# Patient Record
Sex: MALE | Race: BLACK OR AFRICAN AMERICAN
[De-identification: names, ages, dates, MRNs, and addresses within clinical notes are randomized per-mention and may not be internally consistent; named-entity substitution may affect disease eponyms.]

---

## 2019-06-19 ENCOUNTER — HOSPITAL ENCOUNTER (OUTPATIENT)
Dept: HOSPITAL 62 - SP | Age: 54
End: 2019-06-19
Attending: PODIATRIST
Payer: MEDICARE

## 2019-06-19 DIAGNOSIS — L97.522: Primary | ICD-10-CM

## 2019-06-19 PROCEDURE — 93925 LOWER EXTREMITY STUDY: CPT

## 2019-06-19 PROCEDURE — 93922 UPR/L XTREMITY ART 2 LEVELS: CPT

## 2019-06-20 ENCOUNTER — HOSPITAL ENCOUNTER (OUTPATIENT)
Dept: HOSPITAL 62 - OD | Age: 54
End: 2019-06-20
Attending: PODIATRIST
Payer: MEDICARE

## 2019-06-20 DIAGNOSIS — E11.621: Primary | ICD-10-CM

## 2019-06-20 DIAGNOSIS — L97.522: ICD-10-CM

## 2019-06-20 DIAGNOSIS — M77.32: ICD-10-CM

## 2019-06-20 LAB
ADD MANUAL DIFF: NO
ALBUMIN SERPL-MCNC: 4.4 G/DL (ref 3.5–5)
ALP SERPL-CCNC: 134 U/L (ref 38–126)
ALT SERPL-CCNC: 36 U/L (ref 21–72)
ANION GAP SERPL CALC-SCNC: 12 MMOL/L (ref 5–19)
AST SERPL-CCNC: 28 U/L (ref 17–59)
BASOPHILS # BLD AUTO: 0.1 10^3/UL (ref 0–0.2)
BASOPHILS NFR BLD AUTO: 0.9 % (ref 0–2)
BILIRUB DIRECT SERPL-MCNC: 0.2 MG/DL (ref 0–0.4)
BILIRUB SERPL-MCNC: 0.5 MG/DL (ref 0.2–1.3)
BUN SERPL-MCNC: 14 MG/DL (ref 7–20)
CALCIUM: 9.5 MG/DL (ref 8.4–10.2)
CHLORIDE SERPL-SCNC: 103 MMOL/L (ref 98–107)
CO2 SERPL-SCNC: 27 MMOL/L (ref 22–30)
CRP SERPL-MCNC: 6.4 MG/L (ref ?–10)
EOSINOPHIL # BLD AUTO: 0.4 10^3/UL (ref 0–0.6)
EOSINOPHIL NFR BLD AUTO: 4.6 % (ref 0–6)
ERYTHROCYTE [DISTWIDTH] IN BLOOD BY AUTOMATED COUNT: 15.5 % (ref 11.5–14)
ERYTHROCYTE [SEDIMENTATION RATE] IN BLOOD: 62 MM/HR (ref 0–20)
GLUCOSE SERPL-MCNC: 52 MG/DL (ref 75–110)
HCT VFR BLD CALC: 43.6 % (ref 37.9–51)
HGB BLD-MCNC: 14.5 G/DL (ref 13.5–17)
LYMPHOCYTES # BLD AUTO: 1.8 10^3/UL (ref 0.5–4.7)
LYMPHOCYTES NFR BLD AUTO: 22.1 % (ref 13–45)
MCH RBC QN AUTO: 28.8 PG (ref 27–33.4)
MCHC RBC AUTO-ENTMCNC: 33.3 G/DL (ref 32–36)
MCV RBC AUTO: 86 FL (ref 80–97)
MONOCYTES # BLD AUTO: 0.7 10^3/UL (ref 0.1–1.4)
MONOCYTES NFR BLD AUTO: 8.4 % (ref 3–13)
NEUTROPHILS # BLD AUTO: 5.2 10^3/UL (ref 1.7–8.2)
NEUTS SEG NFR BLD AUTO: 64 % (ref 42–78)
PLATELET # BLD: 263 10^3/UL (ref 150–450)
POTASSIUM SERPL-SCNC: 3.9 MMOL/L (ref 3.6–5)
PROT SERPL-MCNC: 8.3 G/DL (ref 6.3–8.2)
RBC # BLD AUTO: 5.04 10^6/UL (ref 4.35–5.55)
SODIUM SERPL-SCNC: 141.5 MMOL/L (ref 137–145)
TOTAL CELLS COUNTED % (AUTO): 100 %
WBC # BLD AUTO: 8.1 10^3/UL (ref 4–10.5)

## 2019-06-20 PROCEDURE — 83036 HEMOGLOBIN GLYCOSYLATED A1C: CPT

## 2019-06-20 PROCEDURE — 80053 COMPREHEN METABOLIC PANEL: CPT

## 2019-06-20 PROCEDURE — 85025 COMPLETE CBC W/AUTO DIFF WBC: CPT

## 2019-06-20 PROCEDURE — 36415 COLL VENOUS BLD VENIPUNCTURE: CPT

## 2019-06-20 PROCEDURE — 85652 RBC SED RATE AUTOMATED: CPT

## 2019-06-20 PROCEDURE — 86140 C-REACTIVE PROTEIN: CPT

## 2019-06-20 NOTE — RADIOLOGY REPORT (SQ)
EXAM DESCRIPTION:  FOOT LEFT COMPLETE



COMPLETED DATE/TIME:  6/20/2019 12:26 pm



REASON FOR STUDY:  NON-PRS CHRONIC ULCER OTH PRT LEFT FOOT W FAT LAYER EXPOSED L97.522  NON-PRS CHRON
IC ULCER OTH PRT LEFT FOOT W FAT LAYER  E11.621  TYPE 2 DIABETES MELLITUS WITH FOOT ULCER



COMPARISON:  None.



NUMBER OF VIEWS:  Three views.



TECHNIQUE:  AP, lateral and oblique  radiographic images acquired of the left foot.



LIMITATIONS:  None.



FINDINGS:  MINERALIZATION: Normal.

BONES: No fracture or dislocation.  No evidence of osteomyelitis.  Plantar calcaneal spur.

JOINTS: No effusions.

SOFT TISSUES: No soft tissue swelling.  No foreign body.

OTHER: No other significant finding.



IMPRESSION:  Calcaneal spur.  No evidence of osteomyelitis.



TECHNICAL DOCUMENTATION:  JOB ID:  [id number]

 2011 [employer]- All Rights Reserved



Reading location - IP/workstation name: [email or web address]

## 2019-06-23 NOTE — XCELERA REPORT
[address] [address]

                               Tel: [phone]

                               Fax: [phone]



                      Lower Extremity Arterial Evaluation

_______________________________________________________________________________



Name: JEREMIAH CORRALES

MRN: [id number]                                       Age: 54 yrs

Gender: Male                                          : 1965

Patient Status: Outpatient                            Patient Location: 

Account #: [id number]

Study Date: 2019 01:10 PM

                        Accession #: [id number]

_______________________________________________________________________________

Procedure: A color flow and duplex scan of the lower extremity arteries was

performed bilaterally with velocity and waveform anaylsis. Ankle brachial

indicies performed.

Reason For Study: ULCER







Ordering Physician: MARYLIN JADE

Performed By: Kenan Toney

_______________________________________________________________________________

_______________________________________________________________________________





Measurements and Calculations



                                     Right  Left

                     CFA PSV         83.5   81.2 cm/sec

                     Prox PFA PSV    -68.3 -93.2 cm/sec

                     Prox SFA PSV    99.8   66.8 cm/sec

                     Mid SFA PSV     -79.1 -80.3 cm/sec

                     Dist SFA PSV    -68.8 -83.4 cm/sec

                     Prox Pop A PSV  69.5  125.7 cm/sec

                     Prox ZACHARY PSV    56.9   17.3 cm/sec

                     Prox PTA PSV           82.9 cm/sec

                     Mid PTA PSV     47.0   93.4 cm/sec

                     Dist PTA PSV    54.4   58.9 cm/sec

                     Nasir Pedis PSV   29.4   28.1 cm/sec



_______________________________________________________________________________

Right Side Arterial Evaluation

Normal velocity and triphasic waveforms noted from the Common Femoral artery

to the Proximal Anterior Tibial artery .

Biphasic with low normal velocity in the Posterior Tibial.

No flow in distal Anterior Tibial, biphasic, low velocity with spectral

broadening in the Dorsalis Pedis arteries.

Ankle Brachial index 0.85.



Left Side Arterial Evaluation

Normal velocity and triphasic waveforms noted from the Common Femoral artery

to the Popliteal artery .

Biphasic with low normal velocity in the Posterior Tibial.

No flow in distal Anterior Tibial. biphasic, normal velocity, minimal

broadening in the Posterior Tibial. Low velocity with spectral broadening in

the Dorsalis Pedis artery.

Ankle Brachial index 0.91.



_______________________________________________________________________________

Interpretation Summary

Moderate hemodynamically significant lesions in the bilateral lower

extremities, on duplex imaging, at rest. Significant disease, complex in the

infrageniculate vessels.



FAVIAN's are in the range of mild obstruction.

_______________________________________________________________________________

Electronically signed by:      Lennox Williams      on 2019 02:59 PM



CC: MARYLIN JADE

>

Williams, Lennox

## 2020-03-03 ENCOUNTER — HOSPITAL ENCOUNTER (OUTPATIENT)
Dept: HOSPITAL 62 - RAD | Age: 55
End: 2020-03-03
Attending: PODIATRIST
Payer: MEDICARE

## 2020-03-03 DIAGNOSIS — L97.522: ICD-10-CM

## 2020-03-03 DIAGNOSIS — M77.32: ICD-10-CM

## 2020-03-03 DIAGNOSIS — E11.621: Primary | ICD-10-CM

## 2020-03-03 LAB
ALBUMIN SERPL-MCNC: 4.2 G/DL (ref 3.5–5)
ALP SERPL-CCNC: 159 U/L (ref 38–126)
ANION GAP SERPL CALC-SCNC: 12 MMOL/L (ref 5–19)
AST SERPL-CCNC: 40 U/L (ref 17–59)
BILIRUB DIRECT SERPL-MCNC: 0.3 MG/DL (ref 0–0.4)
BILIRUB SERPL-MCNC: 0.6 MG/DL (ref 0.2–1.3)
BUN SERPL-MCNC: 23 MG/DL (ref 7–20)
CALCIUM: 8.7 MG/DL (ref 8.4–10.2)
CHLORIDE SERPL-SCNC: 101 MMOL/L (ref 98–107)
CO2 SERPL-SCNC: 27 MMOL/L (ref 22–30)
CRP SERPL-MCNC: 12 MG/L (ref ?–10)
GLUCOSE SERPL-MCNC: 191 MG/DL (ref 75–110)
POTASSIUM SERPL-SCNC: 3.9 MMOL/L (ref 3.6–5)
PROT SERPL-MCNC: 7.6 G/DL (ref 6.3–8.2)

## 2020-03-03 PROCEDURE — 80053 COMPREHEN METABOLIC PANEL: CPT

## 2020-03-03 PROCEDURE — 36415 COLL VENOUS BLD VENIPUNCTURE: CPT

## 2020-03-03 PROCEDURE — 86140 C-REACTIVE PROTEIN: CPT

## 2020-03-03 NOTE — RADIOLOGY REPORT (SQ)
EXAM DESCRIPTION:  FOOT LEFT COMPLETE



COMPLETED DATE/TIME:  3/3/2020 2:28 pm



REASON FOR STUDY:  NON-PRS CHRONIC ULCER OTH PRT LEFT FOOT W FAT LAYER EXPOSED (L97.522) L97.522  NON
-PRS CHRONIC ULCER OTH PRT LEFT FOOT W FAT LAYER  E11.621  TYPE 2 DIABETES MELLITUS WITH FOOT ULCER



COMPARISON:  None.



NUMBER OF VIEWS:  Three views.



TECHNIQUE:  AP, lateral and oblique  radiographic images acquired of the left foot.



LIMITATIONS:  None.



FINDINGS:  MINERALIZATION: Normal.

BONES: No fracture dislocation.  No evidence of osteomyelitis.  Plantar calcaneal spur.

JOINTS: No effusions.

SOFT TISSUES: No soft tissue swelling.  No foreign body.

OTHER: No other significant finding.



IMPRESSION:  Calcaneal spur.  No evidence of osteomyelitis.



TECHNICAL DOCUMENTATION:  JOB ID:  [id number]

 2011 [employer]- All Rights Reserved



Reading location - IP/workstation name: [email or web address]

## 2020-05-30 ENCOUNTER — HOSPITAL ENCOUNTER (OUTPATIENT)
Dept: HOSPITAL 62 - OD | Age: 55
End: 2020-05-30
Attending: FAMILY MEDICINE
Payer: MEDICARE

## 2020-05-30 ENCOUNTER — HOSPITAL ENCOUNTER (OUTPATIENT)
Dept: HOSPITAL 62 - WC | Age: 55
End: 2020-05-30
Attending: PODIATRIST
Payer: MEDICARE

## 2020-05-30 DIAGNOSIS — I10: ICD-10-CM

## 2020-05-30 DIAGNOSIS — M54.9: ICD-10-CM

## 2020-05-30 DIAGNOSIS — Z98.1: ICD-10-CM

## 2020-05-30 DIAGNOSIS — I25.10: ICD-10-CM

## 2020-05-30 DIAGNOSIS — Z79.899: ICD-10-CM

## 2020-05-30 DIAGNOSIS — E78.5: ICD-10-CM

## 2020-05-30 DIAGNOSIS — L97.522: ICD-10-CM

## 2020-05-30 DIAGNOSIS — E11.21: Primary | ICD-10-CM

## 2020-05-30 DIAGNOSIS — E11.621: Primary | ICD-10-CM

## 2020-05-30 DIAGNOSIS — E03.9: ICD-10-CM

## 2020-05-30 LAB
ADD MANUAL DIFF: NO
ADD MANUAL DIFF: NO
ALBUMIN SERPL-MCNC: 4.1 G/DL (ref 3.5–5)
ALBUMIN SERPL-MCNC: 4.1 G/DL (ref 3.5–5)
ALP SERPL-CCNC: 130 U/L (ref 38–126)
ALP SERPL-CCNC: 130 U/L (ref 38–126)
ANION GAP SERPL CALC-SCNC: 9 MMOL/L (ref 5–19)
ANION GAP SERPL CALC-SCNC: 9 MMOL/L (ref 5–19)
AST SERPL-CCNC: 29 U/L (ref 17–59)
AST SERPL-CCNC: 29 U/L (ref 17–59)
BASOPHILS # BLD AUTO: 0 10^3/UL (ref 0–0.2)
BASOPHILS # BLD AUTO: 0 10^3/UL (ref 0–0.2)
BASOPHILS NFR BLD AUTO: 0.7 % (ref 0–2)
BASOPHILS NFR BLD AUTO: 0.7 % (ref 0–2)
BILIRUB DIRECT SERPL-MCNC: 0 MG/DL (ref 0–0.4)
BILIRUB DIRECT SERPL-MCNC: 0 MG/DL (ref 0–0.4)
BILIRUB SERPL-MCNC: 0.7 MG/DL (ref 0.2–1.3)
BILIRUB SERPL-MCNC: 0.7 MG/DL (ref 0.2–1.3)
BUN SERPL-MCNC: 17 MG/DL (ref 7–20)
BUN SERPL-MCNC: 17 MG/DL (ref 7–20)
CALCIUM: 9.1 MG/DL (ref 8.4–10.2)
CALCIUM: 9.1 MG/DL (ref 8.4–10.2)
CHLORIDE SERPL-SCNC: 102 MMOL/L (ref 98–107)
CHLORIDE SERPL-SCNC: 102 MMOL/L (ref 98–107)
CHOLEST SERPL-MCNC: 168.66 MG/DL (ref 0–200)
CO2 SERPL-SCNC: 28 MMOL/L (ref 22–30)
CO2 SERPL-SCNC: 28 MMOL/L (ref 22–30)
CRP SERPL-MCNC: 19.9 MG/L (ref ?–10)
EOSINOPHIL # BLD AUTO: 0.5 10^3/UL (ref 0–0.6)
EOSINOPHIL # BLD AUTO: 0.5 10^3/UL (ref 0–0.6)
EOSINOPHIL NFR BLD AUTO: 6.8 % (ref 0–6)
EOSINOPHIL NFR BLD AUTO: 6.8 % (ref 0–6)
ERYTHROCYTE [DISTWIDTH] IN BLOOD BY AUTOMATED COUNT: 15.6 % (ref 11.5–14)
ERYTHROCYTE [DISTWIDTH] IN BLOOD BY AUTOMATED COUNT: 15.6 % (ref 11.5–14)
ERYTHROCYTE [SEDIMENTATION RATE] IN BLOOD: 57 MM/HR (ref 0–20)
GLUCOSE SERPL-MCNC: 126 MG/DL (ref 75–110)
GLUCOSE SERPL-MCNC: 126 MG/DL (ref 75–110)
HCT VFR BLD CALC: 42.7 % (ref 37.9–51)
HCT VFR BLD CALC: 42.7 % (ref 37.9–51)
HGB BLD-MCNC: 14.3 G/DL (ref 13.5–17)
HGB BLD-MCNC: 14.3 G/DL (ref 13.5–17)
LDLC SERPL DIRECT ASSAY-MCNC: 133 MG/DL (ref ?–100)
LYMPHOCYTES # BLD AUTO: 1.6 10^3/UL (ref 0.5–4.7)
LYMPHOCYTES # BLD AUTO: 1.6 10^3/UL (ref 0.5–4.7)
LYMPHOCYTES NFR BLD AUTO: 22.6 % (ref 13–45)
LYMPHOCYTES NFR BLD AUTO: 22.6 % (ref 13–45)
MCH RBC QN AUTO: 29.9 PG (ref 27–33.4)
MCH RBC QN AUTO: 29.9 PG (ref 27–33.4)
MCHC RBC AUTO-ENTMCNC: 33.5 G/DL (ref 32–36)
MCHC RBC AUTO-ENTMCNC: 33.5 G/DL (ref 32–36)
MCV RBC AUTO: 89 FL (ref 80–97)
MCV RBC AUTO: 89 FL (ref 80–97)
MONOCYTES # BLD AUTO: 0.6 10^3/UL (ref 0.1–1.4)
MONOCYTES # BLD AUTO: 0.6 10^3/UL (ref 0.1–1.4)
MONOCYTES NFR BLD AUTO: 8.8 % (ref 3–13)
MONOCYTES NFR BLD AUTO: 8.8 % (ref 3–13)
NEUTROPHILS # BLD AUTO: 4.4 10^3/UL (ref 1.7–8.2)
NEUTROPHILS # BLD AUTO: 4.4 10^3/UL (ref 1.7–8.2)
NEUTS SEG NFR BLD AUTO: 61.1 % (ref 42–78)
NEUTS SEG NFR BLD AUTO: 61.1 % (ref 42–78)
PLATELET # BLD: 203 10^3/UL (ref 150–450)
PLATELET # BLD: 203 10^3/UL (ref 150–450)
POTASSIUM SERPL-SCNC: 3.9 MMOL/L (ref 3.6–5)
POTASSIUM SERPL-SCNC: 3.9 MMOL/L (ref 3.6–5)
PROT SERPL-MCNC: 8 G/DL (ref 6.3–8.2)
PROT SERPL-MCNC: 8 G/DL (ref 6.3–8.2)
RBC # BLD AUTO: 4.78 10^6/UL (ref 4.35–5.55)
RBC # BLD AUTO: 4.78 10^6/UL (ref 4.35–5.55)
TOTAL CELLS COUNTED % (AUTO): 100 %
TOTAL CELLS COUNTED % (AUTO): 100 %
TRIGL SERPL-MCNC: 121 MG/DL (ref ?–150)
VLDLC SERPL CALC-MCNC: 24 MG/DL (ref 10–31)
WBC # BLD AUTO: 7.1 10^3/UL (ref 4–10.5)
WBC # BLD AUTO: 7.1 10^3/UL (ref 4–10.5)

## 2020-05-30 PROCEDURE — 80053 COMPREHEN METABOLIC PANEL: CPT

## 2020-05-30 PROCEDURE — 85025 COMPLETE CBC W/AUTO DIFF WBC: CPT

## 2020-05-30 PROCEDURE — 87070 CULTURE OTHR SPECIMN AEROBIC: CPT

## 2020-05-30 PROCEDURE — 85652 RBC SED RATE AUTOMATED: CPT

## 2020-05-30 PROCEDURE — 84403 ASSAY OF TOTAL TESTOSTERONE: CPT

## 2020-05-30 PROCEDURE — 86140 C-REACTIVE PROTEIN: CPT

## 2020-05-30 PROCEDURE — 82570 ASSAY OF URINE CREATININE: CPT

## 2020-05-30 PROCEDURE — 36415 COLL VENOUS BLD VENIPUNCTURE: CPT

## 2020-05-30 PROCEDURE — 72220 X-RAY EXAM SACRUM TAILBONE: CPT

## 2020-05-30 PROCEDURE — 83036 HEMOGLOBIN GLYCOSYLATED A1C: CPT

## 2020-05-30 PROCEDURE — 84443 ASSAY THYROID STIM HORMONE: CPT

## 2020-05-30 PROCEDURE — 82746 ASSAY OF FOLIC ACID SERUM: CPT

## 2020-05-30 PROCEDURE — 84402 ASSAY OF FREE TESTOSTERONE: CPT

## 2020-05-30 PROCEDURE — 82607 VITAMIN B-12: CPT

## 2020-05-30 PROCEDURE — 80061 LIPID PANEL: CPT

## 2020-05-30 PROCEDURE — 72110 X-RAY EXAM L-2 SPINE 4/>VWS: CPT

## 2020-05-30 PROCEDURE — 82043 UR ALBUMIN QUANTITATIVE: CPT

## 2020-05-30 NOTE — RADIOLOGY REPORT (SQ)
EXAM DESCRIPTION:  SACRUM AND COCCYX



IMAGES COMPLETED DATE/TIME:  5/30/2020 9:42 am



REASON FOR STUDY:  PAIN E11.21  TYPE 2 DIABETES MELLITUS WITH DIABETIC NEPHROPATHY I10  ESSENTIAL (UT
IMARY) HYPERTENSION E78.5  HYPERLIPIDEMIA, UNSPECIFIED



COMPARISON:  None.



NUMBER OF VIEWS:  Three views.



TECHNIQUE:  AP, lateral, and tilt views of the sacrum and coccyx.



LIMITATIONS:  None.



FINDINGS:  MINERALIZATION: Normal.

BONES: No acute fracture or dislocation.  No worrisome bone lesions.

JOINTS:  Vacuum phenomenon bilateral SI joints with mild bony sclerosis.  Slight dorsal subluxation o
f the coccyx with respect to the distal sacral fragments.

SOFT TISSUES: No soft tissue swelling.  No foreign body.

OTHER: Old lower lumbar bilateral laminectomies at L4 and L5 with lower lumbar fusion hardware



IMPRESSION:  SI joint arthropathy



TECHNICAL DOCUMENTATION:  JOB ID:  [id number]

 2011 [employer]- All Rights Reserved



Reading location - IP/workstation name: [email or web address]

## 2020-05-30 NOTE — RADIOLOGY REPORT (SQ)
EXAM DESCRIPTION:  LUMBAR SPINE COMPLETE



IMAGES COMPLETED DATE/TIME:  5/30/2020 9:42 am



REASON FOR STUDY:  PAIN E11.21  TYPE 2 DIABETES MELLITUS WITH DIABETIC NEPHROPATHY I10  ESSENTIAL (AL
IMARY) HYPERTENSION E78.5  HYPERLIPIDEMIA, UNSPECIFIED



COMPARISON:  None.



NUMBER OF VIEWS:  Five views including obliques.



TECHNIQUE:  AP, lateral, oblique, and sacral radiographic images acquired of the lumbar spine.



LIMITATIONS:  None.



FINDINGS:  MINERALIZATION: Normal.

SEGMENTATION: Normal.  No transitional anatomy.

ALIGNMENT: Normal.

VERTEBRAE: Maintained height.  No fracture or worrisome bone lesion.

DISCS: Disc space loss of height L2-3.

Post fusion from L3 through S1 with transpedicular screws and dorsal fixation plates.  Hardware intac
t.

POSTERIOR ELEMENTS: Bilateral laminectomies at L4 and L5

HARDWARE: None in the spine.

PARASPINAL SOFT TISSUES: Normal.

PELVIS: Vacuum phenomenon bilateral SI joints

OTHER: No other significant finding.



IMPRESSION:  Lower lobe lumbar fusion.

Disc space loss of height at L2-3 just above the fusion level.



TECHNICAL DOCUMENTATION:  JOB ID:  [id number]

 2011 [employer]- All Rights Reserved



Reading location - IP/workstation name: [email or web address]

## 2020-05-30 NOTE — RADIOLOGY REPORT (SQ)
EXAM DESCRIPTION:  FOOT LEFT COMPLETE



IMAGES COMPLETED DATE/TIME:  5/30/2020 9:42 am



REASON FOR STUDY:  NON-PRESSURE CHRONIC ULCER OF OTHER PART OF LEFT FOOT E11.621  TYPE 2 DIABETES IAN
LITUS WITH FOOT ULCER L97.522  NON-PRS CHRONIC ULCER OTH PRT LEFT FOOT W FAT LAYER



COMPARISON:  Left foot films 3/3/2020, 6/20/2019, 8/12/2010



NUMBER OF VIEWS:  Three views.



TECHNIQUE:  AP, lateral and oblique  radiographic images acquired of the left foot.



LIMITATIONS:  None.



FINDINGS:  MINERALIZATION: Normal.

BONES: No acute fracture or dislocation.  No worrisome bone lesions.

JOINTS: No effusions.

SOFT TISSUES: Soft tissue ulcer over the plantar medial aspect of the great toe.  No aggressive bony 
demineralization worrisome for osteomyelitis.  Diffuse forefoot soft tissue swelling

OTHER: No other significant finding.



IMPRESSION:  Soft tissue ulcer over the plantar medial aspect of the great toe.  No aggressive bony d
emineralization worrisome for osteomyelitis.  Diffuse forefoot soft tissue swelling



TECHNICAL DOCUMENTATION:  JOB ID:  [id number]

 2011 [employer]- All Rights Reserved



Reading location - IP/workstation name: [email or web address]

## 2020-06-01 LAB
CREAT UR-MCNC: 154 MG/DL
MICROALBUMIN UR-MCNC: 148.9 UG/ML

## 2020-06-18 ENCOUNTER — HOSPITAL ENCOUNTER (OUTPATIENT)
Dept: HOSPITAL 62 - RAD | Age: 55
End: 2020-06-18
Attending: FAMILY MEDICINE
Payer: MEDICAID

## 2020-06-18 DIAGNOSIS — R94.5: Primary | ICD-10-CM

## 2020-06-18 PROCEDURE — 76705 ECHO EXAM OF ABDOMEN: CPT

## 2020-06-18 NOTE — RADIOLOGY REPORT (SQ)
EXAM DESCRIPTION:  U/S ABDOMEN LIMITED W/O DOP



IMAGES COMPLETED DATE/TIME:  6/18/2020 11:46 am



REASON FOR STUDY:  R94.5 ABNORMAL RESULTS OF LIVER FUNCTION STUDIES R94.5  ABNORMAL RESULTS OF LIVER 
FUNCTION STUDIES



COMPARISON:  None.



TECHNIQUE:  Dynamic and static grayscale images acquired of the abdomen and recorded on PACS. Additio
nal selected color Doppler and spectral images recorded.



LIMITATIONS:  None.



FINDINGS:  PANCREAS: No masses.  Visualized pancreatic duct normal caliber.

LIVER: Slightly heterogeneous.  Slightly echogenic.

LIVER VASCULATURE: Normal directional flow of the main portal vein and hepatic veins.

GALLBLADDER: No stones. Normal wall thickness. No pericholecystic fluid.

ULTRASOUND-DETECTED WHIPPLE'S SIGN: Negative.

INTRAHEPATIC DUCTS AND COMMON DUCT: CBD and intrahepatic ducts normal caliber. No filling defects.

INFERIOR VENA CAVA: Not imaged.

AORTA: No aneurysm.

RIGHT KIDNEY:  Normal size, 11.4 cm. Normal echogenicity. No solid or suspicious masses. No hydroneph
rosis. No calcifications.

PERITONEAL AND RIGHT PLEURAL SPACE: No ascites or effusions.

OTHER: No other significant findings.



IMPRESSION:  There appears to be mild hepatic steatosis.



TECHNICAL DOCUMENTATION:  JOB ID:  [id number]

 2011 [employer]- All Rights Reserved



Reading location - IP/workstation name: [email or web address]

## 2021-01-19 ENCOUNTER — HOSPITAL ENCOUNTER (OUTPATIENT)
Dept: HOSPITAL 62 - RAD | Age: 56
End: 2021-01-19
Attending: FAMILY MEDICINE
Payer: MEDICARE

## 2021-01-19 DIAGNOSIS — M25.512: Primary | ICD-10-CM

## 2021-01-19 DIAGNOSIS — Z79.899: ICD-10-CM

## 2021-01-19 DIAGNOSIS — E11.21: ICD-10-CM

## 2021-01-19 DIAGNOSIS — E78.5: ICD-10-CM

## 2021-01-19 DIAGNOSIS — E11.621: ICD-10-CM

## 2021-01-19 LAB
ADD MANUAL DIFF: NO
ALBUMIN SERPL-MCNC: 3.9 G/DL (ref 3.5–5)
ALP SERPL-CCNC: 144 U/L (ref 38–126)
ANION GAP SERPL CALC-SCNC: 6 MMOL/L (ref 5–19)
AST SERPL-CCNC: 38 U/L (ref 17–59)
BASOPHILS # BLD AUTO: 0.1 10^3/UL (ref 0–0.2)
BASOPHILS NFR BLD AUTO: 1.6 % (ref 0–2)
BILIRUB DIRECT SERPL-MCNC: 0.2 MG/DL (ref 0–0.4)
BILIRUB SERPL-MCNC: 0.4 MG/DL (ref 0.2–1.3)
BUN SERPL-MCNC: 17 MG/DL (ref 7–20)
CALCIUM: 8.9 MG/DL (ref 8.4–10.2)
CHLORIDE SERPL-SCNC: 105 MMOL/L (ref 98–107)
CHOLEST SERPL-MCNC: 142.67 MG/DL (ref 0–200)
CO2 SERPL-SCNC: 28 MMOL/L (ref 22–30)
EOSINOPHIL # BLD AUTO: 0.5 10^3/UL (ref 0–0.6)
EOSINOPHIL NFR BLD AUTO: 6.3 % (ref 0–6)
ERYTHROCYTE [DISTWIDTH] IN BLOOD BY AUTOMATED COUNT: 15.3 % (ref 11.5–14)
GLUCOSE SERPL-MCNC: 185 MG/DL (ref 75–110)
HCT VFR BLD CALC: 44.8 % (ref 37.9–51)
HGB BLD-MCNC: 14.7 G/DL (ref 13.5–17)
LDLC SERPL DIRECT ASSAY-MCNC: 97 MG/DL (ref ?–100)
LYMPHOCYTES # BLD AUTO: 2.1 10^3/UL (ref 0.5–4.7)
LYMPHOCYTES NFR BLD AUTO: 24.9 % (ref 13–45)
MCH RBC QN AUTO: 28.4 PG (ref 27–33.4)
MCHC RBC AUTO-ENTMCNC: 32.8 G/DL (ref 32–36)
MCV RBC AUTO: 87 FL (ref 80–97)
MONOCYTES # BLD AUTO: 0.6 10^3/UL (ref 0.1–1.4)
MONOCYTES NFR BLD AUTO: 6.6 % (ref 3–13)
NEUTROPHILS # BLD AUTO: 5.2 10^3/UL (ref 1.7–8.2)
NEUTS SEG NFR BLD AUTO: 60.6 % (ref 42–78)
PLATELET # BLD: 185 10^3/UL (ref 150–450)
POTASSIUM SERPL-SCNC: 4.1 MMOL/L (ref 3.6–5)
PROT SERPL-MCNC: 7.3 G/DL (ref 6.3–8.2)
RBC # BLD AUTO: 5.17 10^6/UL (ref 4.35–5.55)
TOTAL CELLS COUNTED % (AUTO): 100 %
TRIGL SERPL-MCNC: 119 MG/DL (ref ?–150)
URATE SERPL-MCNC: 5.8 MG/DL (ref 3.5–8.5)
VLDLC SERPL CALC-MCNC: 24 MG/DL (ref 10–31)
WBC # BLD AUTO: 8.5 10^3/UL (ref 4–10.5)

## 2021-01-19 PROCEDURE — 80053 COMPREHEN METABOLIC PANEL: CPT

## 2021-01-19 PROCEDURE — 82570 ASSAY OF URINE CREATININE: CPT

## 2021-01-19 PROCEDURE — 84550 ASSAY OF BLOOD/URIC ACID: CPT

## 2021-01-19 PROCEDURE — 85025 COMPLETE CBC W/AUTO DIFF WBC: CPT

## 2021-01-19 PROCEDURE — 36415 COLL VENOUS BLD VENIPUNCTURE: CPT

## 2021-01-19 PROCEDURE — 80061 LIPID PANEL: CPT

## 2021-01-19 PROCEDURE — 83036 HEMOGLOBIN GLYCOSYLATED A1C: CPT

## 2021-01-19 PROCEDURE — 82043 UR ALBUMIN QUANTITATIVE: CPT

## 2021-01-19 NOTE — RADIOLOGY REPORT (SQ)
EXAM DESCRIPTION:  SHOULDER LEFT 2 OR MORE VIEWS



IMAGES COMPLETED DATE/TIME:  1/19/2021 3:34 pm



REASON FOR STUDY:  (M25.519)PAIN IN UNSPECIFIED SHOULDER M25.519  PAIN IN UNSPECIFIED SHOULDER E78.5 
 HYPERLIPIDEMIA, UNSPECIFIED E11.21  TYPE 2 DIABETES MELLITUS WITH DIABETIC NEPHROPATHY



COMPARISON:  None.



NUMBER OF VIEWS:  Three view.



TECHNIQUE:  Internal rotation, external rotation, and Y view images acquired of the left shoulder.



LIMITATIONS:  None.



FINDINGS:  MINERALIZATION: Normal.

BONES: No acute fracture. No worrisome bone lesions. No significant osteophytes.

GLENOHUMERAL JOINT: No significant findings.

ACROMIOCLAVICULAR JOINT: No large osteophytes.

SOFT TISSUES: No calcifications.

VISUALIZED RIBS, SPINE, AND LUNG: No other significant finding.

OTHER: No other significant finding.



IMPRESSION:  NEGATIVE STUDY OF THE LEFT SHOULDER. NO EXPLANATION FOR PAIN.



TECHNICAL DOCUMENTATION:  JOB ID:  [id number]

 2011 [employer]- All Rights Reserved



Reading location - IP/workstation name: [email or web address]